# Patient Record
Sex: MALE | Race: WHITE | NOT HISPANIC OR LATINO | Employment: UNEMPLOYED | URBAN - NONMETROPOLITAN AREA
[De-identification: names, ages, dates, MRNs, and addresses within clinical notes are randomized per-mention and may not be internally consistent; named-entity substitution may affect disease eponyms.]

---

## 2023-01-09 ENCOUNTER — OFFICE VISIT (OUTPATIENT)
Dept: URGENT CARE | Facility: CLINIC | Age: 48
End: 2023-01-09

## 2023-01-09 VITALS
OXYGEN SATURATION: 94 % | HEART RATE: 94 BPM | TEMPERATURE: 97.9 F | SYSTOLIC BLOOD PRESSURE: 130 MMHG | RESPIRATION RATE: 17 BRPM | DIASTOLIC BLOOD PRESSURE: 70 MMHG

## 2023-01-09 DIAGNOSIS — J20.9 ACUTE BRONCHITIS, UNSPECIFIED ORGANISM: Primary | ICD-10-CM

## 2023-01-09 RX ORDER — ALBUTEROL SULFATE 90 UG/1
2 AEROSOL, METERED RESPIRATORY (INHALATION)
COMMUNITY

## 2023-01-09 RX ORDER — BENZONATATE 200 MG/1
200 CAPSULE ORAL 3 TIMES DAILY PRN
Qty: 20 CAPSULE | Refills: 0 | Status: SHIPPED | OUTPATIENT
Start: 2023-01-09

## 2023-01-09 RX ORDER — AZITHROMYCIN 250 MG/1
TABLET, FILM COATED ORAL
Qty: 6 TABLET | Refills: 0 | Status: SHIPPED | OUTPATIENT
Start: 2023-01-09 | End: 2023-01-13

## 2023-01-09 NOTE — PROGRESS NOTES
[facility]        NAME: Kang Silva is a 52 y o  male  : 1975    MRN: [id number]  DATE: 2023  TIME: 1:58 PM    Assessment and Plan   Acute bronchitis, unspecified organism [J20 [id number]]  1  Acute bronchitis, unspecified organism  azithromycin (ZITHROMAX) 250 mg tablet    benzonatate (TESSALON) 200 MG capsule        Discussed problem with patient  Prescribing Z-Norberto for bronchitis symptoms as well as Tessalon Perles for cough complaints  Advised patient to continue to use albuterol inhaler as needed for shortness of breath and wheezing  Should be pushing fluids and recommended over-the-counter cold and flu medication for symptoms  Should report to the ER if symptoms worsen  Patient Instructions       Follow up with PCP in 3-5 days  Proceed to  ER if symptoms worsen  Chief Complaint     Chief Complaint   Patient presents with   • Cold Like Symptoms   • Cough   • Sore Throat     Started Friday  OTC advil cold and sinus           History of Present Illness       80-year-old male with a history of COPD and bronchitis and pneumonia presents with 3 days of nasal congestion, cough, sore throat  Patient has had increased use of albuterol inhaler the last 3 days  Denies any fevers or chills but appetite is decreased and has not been pushing fluids  Denies any sinus pressure or pain  Denies any shortness of breath, wheezing  Denies abdominal complaints      Review of Systems   Review of Systems   Constitutional: Positive for appetite change  Negative for chills, fatigue and fever  HENT: Positive for congestion, postnasal drip, rhinorrhea and sore throat  Negative for ear pain, sinus pressure and sinus pain  Respiratory: Positive for cough  Negative for shortness of breath, wheezing and stridor  Cardiovascular: Negative for chest pain and palpitations  Gastrointestinal: Negative for abdominal pain, constipation, diarrhea, nausea and vomiting     Musculoskeletal: Negative for myalgias  Neurological: Negative for dizziness, syncope, light-headedness and headaches  Current Medications       Current Outpatient Medications:   •  azithromycin (ZITHROMAX) 250 mg tablet, Take 2 tablets today then 1 tablet daily x 4 days, Disp: 6 tablet, Rfl: 0  •  benzonatate (TESSALON) 200 MG capsule, Take 1 capsule (200 mg total) by mouth 3 (three) times a day as needed for cough, Disp: 20 capsule, Rfl: 0  •  albuterol (PROVENTIL HFA,VENTOLIN HFA) 90 mcg/act inhaler, Inhale 2 puffs, Disp: , Rfl:     Current Allergies     Allergies as of 01/09/2023 - Reviewed 01/09/2023   Allergen Reaction Noted   • Bee pollen Anaphylaxis, Hives, and Other (See Comments) 12/17/2016   • Milk-related compounds - food allergy Diarrhea 10/18/2019            The following portions of the patient's history were reviewed and updated as appropriate: allergies, current medications, past family history, past medical history, past social history, past surgical history and problem list      History reviewed  No pertinent past medical history  History reviewed  No pertinent surgical history  History reviewed  No pertinent family history  Medications have been verified  Objective   /70   Pulse 94   Temp 97 9 °F (36 6 °C)   Resp 17   SpO2 94%        Physical Exam     Physical Exam  Vitals and nursing note reviewed  Constitutional:       General: He is not in acute distress  Appearance: He is well-developed and normal weight  He is not ill-appearing, toxic-appearing or diaphoretic  HENT:      Head: Normocephalic  Right Ear: Tympanic membrane and ear canal normal  No tenderness  No middle ear effusion  Tympanic membrane is not erythematous  Left Ear: Tympanic membrane and ear canal normal  No tenderness  No middle ear effusion  Tympanic membrane is not erythematous  Nose: Congestion present  Mouth/Throat:      Mouth: Mucous membranes are moist  No oral lesions        Pharynx: Oropharynx is clear  No pharyngeal swelling, oropharyngeal exudate or posterior oropharyngeal erythema  Tonsils: No tonsillar exudate or tonsillar abscesses  Eyes:      Extraocular Movements:      Right eye: Normal extraocular motion  Left eye: Normal extraocular motion  Conjunctiva/sclera: Conjunctivae normal       Pupils: Pupils are equal, round, and reactive to light  Neck:      Thyroid: No thyromegaly  Cardiovascular:      Rate and Rhythm: Normal rate and regular rhythm  Heart sounds: Normal heart sounds  No murmur heard  No friction rub  No gallop  Pulmonary:      Effort: Pulmonary effort is normal  No respiratory distress  Breath sounds: No stridor  Wheezing and rhonchi present  No rales  Chest:      Chest wall: No tenderness  Musculoskeletal:      Cervical back: Normal range of motion and neck supple  Lymphadenopathy:      Cervical: No cervical adenopathy  Skin:     General: Skin is warm and dry  Capillary Refill: Capillary refill takes less than 2 seconds  Coloration: Skin is not pale  Findings: No erythema or rash  Neurological:      General: No focal deficit present  Mental Status: He is alert and oriented to person, place, and time     Psychiatric:         Mood and Affect: Mood normal          Behavior: Behavior normal